# Patient Record
Sex: FEMALE | Race: WHITE | NOT HISPANIC OR LATINO | URBAN - METROPOLITAN AREA
[De-identification: names, ages, dates, MRNs, and addresses within clinical notes are randomized per-mention and may not be internally consistent; named-entity substitution may affect disease eponyms.]

---

## 2020-06-18 ENCOUNTER — OFFICE VISIT (OUTPATIENT)
Dept: URBAN - METROPOLITAN AREA CLINIC 113 | Facility: CLINIC | Age: 65
End: 2020-06-18

## 2020-07-25 ENCOUNTER — TELEPHONE ENCOUNTER (OUTPATIENT)
Dept: URBAN - METROPOLITAN AREA CLINIC 13 | Facility: CLINIC | Age: 65
End: 2020-07-25

## 2020-07-25 RX ORDER — DICYCLOMINE HYDROCHLORIDE 10 MG/1
TAKE 1 CAPSULE EVERY 6 HOURS PRN ABDOMINAL SPASM MDD:4 CAPSULE ORAL
Qty: 60 | Refills: 3 | OUTPATIENT
Start: 2017-10-20 | End: 2018-07-30

## 2020-07-25 RX ORDER — PANTOPRAZOLE SODIUM 40 MG
TAKE 1 TABLET DAILY PRN TABLET, DELAYED RELEASE (ENTERIC COATED) ORAL
Refills: 0 | OUTPATIENT
Start: 2008-05-19 | End: 2017-10-18

## 2020-07-25 RX ORDER — ESTRADIOL 0.1 MG/G
INSERT 1 APPLICATOR WEEKLY CREAM VAGINAL
Refills: 0 | OUTPATIENT
Start: 2013-05-25 | End: 2017-10-18

## 2020-07-25 RX ORDER — ATORVASTATIN CALCIUM 20 MG/1
TAKE 1 TABLET DAILY TABLET, FILM COATED ORAL
Refills: 0 | OUTPATIENT
Start: 2012-10-11 | End: 2017-10-18

## 2020-07-25 RX ORDER — HYOSCYAMINE SULFATE 0.12 MG/1
PLACE 1 TABLET UNDER THE TONGUE EVERY 4 TO 6 HOURS AS NEEDED TABLET, ORALLY DISINTEGRATING ORAL
Qty: 90 | Refills: 2 | OUTPATIENT
Start: 2017-10-19 | End: 2018-07-30

## 2020-07-25 RX ORDER — CLOPIDOGREL 75 MG/1
TAKE 1 TABLET DAILY TABLET ORAL
Qty: 30 | Refills: 0 | OUTPATIENT
Start: 2013-01-09 | End: 2020-06-18

## 2020-07-25 RX ORDER — SPIRONOLACTONE 50 MG/1
TAKE 1 TABLET DAILY TABLET, FILM COATED ORAL
Refills: 0 | OUTPATIENT
End: 2018-07-30

## 2020-07-26 ENCOUNTER — TELEPHONE ENCOUNTER (OUTPATIENT)
Dept: URBAN - METROPOLITAN AREA CLINIC 13 | Facility: CLINIC | Age: 65
End: 2020-07-26

## 2020-07-26 RX ORDER — KETOCONAZOLE 20 MG/G
CREAM TOPICAL
Qty: 60 | Refills: 0 | Status: ACTIVE | COMMUNITY
Start: 2012-12-06

## 2020-07-26 RX ORDER — BUPROPION HYDROCHLORIDE 150 MG/1
TAKE 1 TABLET IN THE MORNING AND AT NOON TABLET, EXTENDED RELEASE ORAL
Refills: 0 | Status: ACTIVE | COMMUNITY
Start: 2019-12-05

## 2020-07-26 RX ORDER — VALACYCLOVIR 1 G/1
TAKE 1 TABLET BY MOUTH THREE TIMES DAILY TABLET, FILM COATED ORAL
Qty: 21 | Refills: 0 | Status: ACTIVE | COMMUNITY
Start: 2019-10-14

## 2020-07-26 RX ORDER — CIPROFLOXACIN HYDROCHLORIDE 500 MG/1
TAKE 1 TABLET BY MOUTH TWICE DAILY TABLET, FILM COATED ORAL
Qty: 14 | Refills: 0 | Status: ACTIVE | COMMUNITY
Start: 2019-10-14

## 2020-07-26 RX ORDER — CEPHALEXIN 500 MG/1
TK 1 C PO BID FOR 10 DAYS CAPSULE ORAL
Qty: 20 | Refills: 0 | Status: ACTIVE | COMMUNITY
Start: 2019-07-06

## 2020-07-26 RX ORDER — LIFITEGRAST 50 MG/ML
INSTILL 1 DROP INTO EACH EYE TWICE DAILY SOLUTION/ DROPS OPHTHALMIC
Qty: 60 | Refills: 0 | Status: ACTIVE | COMMUNITY
Start: 2019-06-05

## 2020-07-26 RX ORDER — CLOPIDOGREL 75 MG/1
TABLET ORAL
Qty: 30 | Refills: 0 | Status: ACTIVE | COMMUNITY
Start: 2012-10-11

## 2020-07-26 RX ORDER — PRASTERONE 6.5 MG/1
INSERT ONE EVERY NIGHT INSERT VAGINAL
Qty: 28 | Refills: 0 | Status: ACTIVE | COMMUNITY
Start: 2020-01-23

## 2020-07-26 RX ORDER — ACYCLOVIR 50 MG/G
OINTMENT TOPICAL
Qty: 30 | Refills: 0 | Status: ACTIVE | COMMUNITY
Start: 2013-11-22

## 2020-07-26 RX ORDER — NITROFURANTOIN MONOHYDRATE/MACROCRYSTALLINE 25; 75 MG/1; MG/1
TAKE 1 CAPSULE BY MOUTH EVERY 12 HOURS WITH FOOD CAPSULE ORAL
Qty: 60 | Refills: 0 | Status: ACTIVE | COMMUNITY
Start: 2019-08-21

## 2020-07-26 RX ORDER — LOSARTAN POTASSIUM 50 MG/1
TAKE 1 TABLET DAILY TABLET, FILM COATED ORAL
Refills: 0 | Status: ACTIVE | COMMUNITY

## 2020-07-26 RX ORDER — ATORVASTATIN CALCIUM 40 MG/1
TABLET, FILM COATED ORAL
Qty: 30 | Refills: 0 | Status: ACTIVE | COMMUNITY
Start: 2013-03-11

## 2020-07-26 RX ORDER — ATORVASTATIN CALCIUM 40 MG/1
TABLET, FILM COATED ORAL
Qty: 30 | Refills: 0 | Status: ACTIVE | COMMUNITY
Start: 2013-01-09

## 2020-07-26 RX ORDER — VARENICLINE TARTRATE 1 MG/1
TAKE 1 TABLET TWICE DAILY TABLET, FILM COATED ORAL
Refills: 0 | Status: ACTIVE | COMMUNITY

## 2020-07-26 RX ORDER — AMOXICILLIN 500 MG/1
TAKE 1 CAPSULE BY MOUTH TWICE DAILY CAPSULE ORAL
Qty: 20 | Refills: 0 | Status: ACTIVE | COMMUNITY
Start: 2019-10-08

## 2020-07-26 RX ORDER — METHOTREXATE 2.5 MG/1
TAKE 4 TABLETS WEEKLY TABLET ORAL
Qty: 24 | Refills: 0 | Status: ACTIVE | COMMUNITY
Start: 2012-10-23

## 2020-07-26 RX ORDER — HYOSCYAMINE SULFATE 0.12 MG/1
PLACE 1 TABLET EVERY 6 HOURS PRN ABDOMINAL PAIN PLACE UNDER TONGUE TABLET, ORALLY DISINTEGRATING ORAL
Qty: 60 | Refills: 1 | Status: ACTIVE | COMMUNITY
Start: 2020-06-18

## 2020-07-26 RX ORDER — AZITHROMYCIN DIHYDRATE 250 MG/1
TABLET, FILM COATED ORAL
Qty: 6 | Refills: 0 | Status: ACTIVE | COMMUNITY
Start: 2013-11-22

## 2020-07-26 RX ORDER — PRASUGREL HYDROCHLORIDE 10 MG/1
TABLET, COATED ORAL
Qty: 30 | Refills: 0 | Status: ACTIVE | COMMUNITY
Start: 2012-09-11

## 2020-07-26 RX ORDER — NYSTATIN AND TRIAMCINOLONE ACETONIDE 100000; 1 MG/G; MG/G
CREAM TOPICAL
Qty: 30 | Refills: 0 | Status: ACTIVE | COMMUNITY
Start: 2012-09-22

## 2020-07-26 RX ORDER — OXYBUTYNIN CHLORIDE 5 MG/1
TAKE 1 TABLET BY MOUTH EVERY 8 HOURS AS NEEDED FOR BLADDER TABLET ORAL
Qty: 30 | Refills: 0 | Status: ACTIVE | COMMUNITY
Start: 2019-08-21

## 2020-07-26 RX ORDER — TEMAZEPAM 30 MG/1
TAKE 1 CAPSULE AT BEDTIME AS NEEDED CAPSULE ORAL
Refills: 1 | Status: ACTIVE | COMMUNITY

## 2020-07-26 RX ORDER — BETAMETHASONE VALERATE 1 MG/G
APPLY TWICE DAILY WHILE IRRITATED CREAM TOPICAL
Qty: 30 | Refills: 0 | Status: ACTIVE | COMMUNITY
Start: 2020-01-23

## 2020-07-26 RX ORDER — VARENICLINE TARTRATE 1 MG/1
TABLET, FILM COATED ORAL
Qty: 180 | Refills: 0 | Status: ACTIVE | COMMUNITY
Start: 2013-05-17

## 2020-07-26 RX ORDER — ATORVASTATIN CALCIUM 10 MG/1
TABLET, FILM COATED ORAL
Qty: 90 | Refills: 0 | Status: ACTIVE | COMMUNITY
Start: 2013-12-03

## 2020-07-26 RX ORDER — FLUCONAZOLE 200 MG/1
TABLET ORAL
Qty: 3 | Refills: 0 | Status: ACTIVE | COMMUNITY
Start: 2012-09-22

## 2020-07-26 RX ORDER — FLUCONAZOLE 150 MG/1
TABLET ORAL
Qty: 1 | Refills: 0 | Status: ACTIVE | COMMUNITY
Start: 2013-09-20

## 2020-07-26 RX ORDER — AMOXICILLIN 500 MG/1
CAPSULE ORAL
Qty: 30 | Refills: 0 | Status: ACTIVE | COMMUNITY
Start: 2013-09-20

## 2020-09-22 ENCOUNTER — OFFICE VISIT (OUTPATIENT)
Dept: URBAN - METROPOLITAN AREA CLINIC 113 | Facility: CLINIC | Age: 65
End: 2020-09-22

## 2020-10-14 ENCOUNTER — WEB ENCOUNTER (OUTPATIENT)
Dept: URBAN - METROPOLITAN AREA CLINIC 113 | Facility: CLINIC | Age: 65
End: 2020-10-14

## 2020-10-14 ENCOUNTER — OFFICE VISIT (OUTPATIENT)
Dept: URBAN - METROPOLITAN AREA CLINIC 113 | Facility: CLINIC | Age: 65
End: 2020-10-14
Payer: MEDICARE

## 2020-10-14 VITALS
TEMPERATURE: 97.1 F | SYSTOLIC BLOOD PRESSURE: 139 MMHG | HEIGHT: 67 IN | DIASTOLIC BLOOD PRESSURE: 65 MMHG | WEIGHT: 126 LBS | BODY MASS INDEX: 19.78 KG/M2 | RESPIRATION RATE: 20 BRPM | HEART RATE: 52 BPM

## 2020-10-14 DIAGNOSIS — K59.04 CHRONIC IDIOPATHIC CONSTIPATION: ICD-10-CM

## 2020-10-14 DIAGNOSIS — R10.31 RLQ ABDOMINAL PAIN: ICD-10-CM

## 2020-10-14 PROBLEM — 82934008: Status: ACTIVE | Noted: 2020-10-14

## 2020-10-14 PROCEDURE — 99213 OFFICE O/P EST LOW 20 MIN: CPT | Performed by: INTERNAL MEDICINE

## 2020-10-14 RX ORDER — FOLIC ACID 1 MG/1
1 TABLET TABLET ORAL ONCE A DAY
Status: ACTIVE | COMMUNITY

## 2020-10-14 RX ORDER — HYOSCYAMINE SULFATE 0.12 MG/1
PLACE 1 TABLET EVERY 6 HOURS PRN ABDOMINAL PAIN PLACE UNDER TONGUE TABLET, ORALLY DISINTEGRATING ORAL
Qty: 60 | Refills: 1 | Status: ACTIVE | COMMUNITY
Start: 2020-06-18

## 2020-10-14 RX ORDER — METHOTREXATE 2.5 MG/1
TAKE 8 TABLETS TABLET ORAL ONCE A DAY
Refills: 0 | Status: ACTIVE | COMMUNITY
Start: 2012-10-23

## 2020-10-14 RX ORDER — PRASTERONE 6.5 MG/1
INSERT ONE EVERY NIGHT INSERT VAGINAL
Qty: 28 | Refills: 0 | Status: ACTIVE | COMMUNITY
Start: 2020-01-23

## 2020-10-14 RX ORDER — LOSARTAN POTASSIUM 50 MG/1
TAKE 1 TABLET DAILY TABLET, FILM COATED ORAL
Refills: 0 | Status: ACTIVE | COMMUNITY

## 2020-10-14 RX ORDER — BISOPROLOL FUMARATE AND HYDROCHLOROTHIAZIDE 10; 6.25 MG/1; MG/1
1 TABLET TABLET, FILM COATED ORAL ONCE A DAY
Status: ACTIVE | COMMUNITY

## 2020-10-14 RX ORDER — TEMAZEPAM 30 MG/1
TAKE 1 CAPSULE AT BEDTIME AS NEEDED CAPSULE ORAL
Refills: 1 | Status: ACTIVE | COMMUNITY

## 2020-10-14 NOTE — HPI-TODAY'S VISIT:
Ms. Patiño is a 66yo female with a history of rheumatoid arthritis on Methotrexate, CAD s/p cardiac stenting on Plavix per Dr. Stallworth, GERD, dyspepsia, adenomatous colon polyps and diverticulosis coli who presents for follow-up evaluation of abdominal pain. She was last seen in the office on October 18, 2017, which she reported a several week history of dull, cramping right lower quadrant pain, with associated "bladder spasms" and occasional right hip pain radiating into her lower leg. She described the pain as a 1 on a scale of 1-10, but at its worst was described as a 4.  She has previously had a total hysterectomy and also reports a history of vulvar intraepithelial neoplasia (PATRICIA), managed by Dr. Menjivar, GYN oncologist.  The patient had a CT scan of the abdomen and pelvis ordered after her last office visit, and was given hyoscyamine for pain. This was done on October 24, 2017 and should've moderate amount of stool in the lower colon but no other abnormalities. The patient had another CT scan of the abdomen and pelvis ordered by Dr. Persaud on May 19, 2020 showing cholelithiasis, chronic constipation, prior appendectomy, and a mild small bowel stasis pattern, as well as a prior hysterectomy. Because of the gallstones, the patient had a HIDA scan performed on June 8, 2020 which showed no evidence of acute cholecystitis and no other abnormalities. The patient describes intermittent right lower quadrant abdominal pain. It was most severe in April 2020. It is not associated with food ingestion. It is often better with defecation. The patient was prescribed Amitiza by Dr. Persaud and is now having a bowel movement daily. She still is having intermittent right lower quadrant abdominal pain. She denies any fever, chills, or sweats. She was actually referred back to this office because of the gallstones seen on her CT scan. Of note, the patient had a colonoscopy done on October 26, 2018. This showed left colon diverticulosis, a 3 mm ascending colon polyp, no other abnormalities. The polyp had focal adenomatous change without dysplasia.   ?? Start : Hyoscyamine Sulfate 0.125 MG Sublingual Tablet Sublingual; PLACE 1 TABLET EVERY 6 HOURS PRN abdominal pain PLACE UNDER TONGUE 1. Begin hyoscyamine 0.25 mg sublingually every 6 hours as needed for pain 2. Benefiber one to 2 tablespoons and MiraLAX 17 g daily to improve bowel function 3. Follow-up in 3 months 4. Repeat colonoscopy in 2023 The patient is doing much better overall.  She is using MiraLAX and fiber and is having bowel movements every 2 days or so.  She is also using stool softeners on a daily basis.  She uses hyoscyamine whenever she gets crampy right lower quadrant pain and this helps her.  She has no specific GI complaints today.

## 2024-03-21 ENCOUNTER — OV NP (OUTPATIENT)
Dept: URBAN - METROPOLITAN AREA CLINIC 113 | Facility: CLINIC | Age: 69
End: 2024-03-21
Payer: MEDICARE

## 2024-03-21 VITALS
TEMPERATURE: 97.5 F | DIASTOLIC BLOOD PRESSURE: 53 MMHG | HEART RATE: 60 BPM | BODY MASS INDEX: 19.62 KG/M2 | HEIGHT: 67 IN | SYSTOLIC BLOOD PRESSURE: 141 MMHG | WEIGHT: 125 LBS

## 2024-03-21 DIAGNOSIS — R10.31 RLQ ABDOMINAL PAIN: ICD-10-CM

## 2024-03-21 DIAGNOSIS — Z86.010 HISTORY OF ADENOMATOUS POLYP OF COLON: ICD-10-CM

## 2024-03-21 DIAGNOSIS — K59.04 CHRONIC IDIOPATHIC CONSTIPATION: ICD-10-CM

## 2024-03-21 PROBLEM — 429047008: Status: ACTIVE | Noted: 2024-03-21

## 2024-03-21 PROBLEM — 301754002: Status: ACTIVE | Noted: 2024-03-21

## 2024-03-21 PROCEDURE — 99203 OFFICE O/P NEW LOW 30 MIN: CPT | Performed by: NURSE PRACTITIONER

## 2024-03-21 RX ORDER — METHOTREXATE 2.5 MG/1
TAKE 8 TABLETS TABLET ORAL ONCE A DAY
Refills: 0 | Status: ACTIVE | COMMUNITY
Start: 2012-10-23

## 2024-03-21 RX ORDER — PRASTERONE 6.5 MG/1
INSERT ONE EVERY NIGHT INSERT VAGINAL
Qty: 28 | Refills: 0 | Status: ACTIVE | COMMUNITY
Start: 2020-01-23

## 2024-03-21 RX ORDER — TEMAZEPAM 30 MG/1
TAKE 1 CAPSULE AT BEDTIME AS NEEDED CAPSULE ORAL
Refills: 1 | Status: ACTIVE | COMMUNITY

## 2024-03-21 RX ORDER — LOSARTAN POTASSIUM 50 MG/1
TAKE 1 TABLET DAILY TABLET, FILM COATED ORAL
Refills: 0 | Status: ACTIVE | COMMUNITY

## 2024-03-21 RX ORDER — FOLIC ACID 1 MG/1
1 TABLET TABLET ORAL ONCE A DAY
Status: ACTIVE | COMMUNITY

## 2024-03-21 RX ORDER — HYOSCYAMINE SULFATE 0.12 MG/1
PLACE 1 TABLET EVERY 6 HOURS PRN ABDOMINAL PAIN PLACE UNDER TONGUE TABLET, ORALLY DISINTEGRATING ORAL
Qty: 60 | Refills: 1 | Status: ACTIVE | COMMUNITY
Start: 2020-06-18

## 2024-03-21 RX ORDER — HYOSCYAMINE SULFATE 0.12 MG/1
PLACE 1 TABLET EVERY 6 HOURS PRN ABDOMINAL PAIN PLACE UNDER TONGUE TABLET, ORALLY DISINTEGRATING ORAL
Qty: 30 | Refills: 3
Start: 2020-06-18 | End: 2024-07-19

## 2024-03-21 RX ORDER — ONDANSETRON 4 MG/1
1 TABLET TABLET, ORALLY DISINTEGRATING ORAL
Qty: 6 | Refills: 0 | OUTPATIENT
Start: 2024-03-21

## 2024-03-21 RX ORDER — SODIUM PICOSULFATE, MAGNESIUM OXIDE, AND ANHYDROUS CITRIC ACID 12; 3.5; 1 G/175ML; G/175ML; MG/175ML
175 ML THE FIRST DOSE THE EVENING BEFORE AND SECOND DOSE THE MORNING OF COLONOSCOPY LIQUID ORAL ONCE A DAY
Qty: 350 | OUTPATIENT
Start: 2024-03-21 | End: 2024-03-23

## 2024-03-21 RX ORDER — HYDROXYCHLOROQUINE SULFATE 100 MG/1
AS DIRECTED TABLET ORAL
Status: ACTIVE | COMMUNITY

## 2024-03-21 RX ORDER — BISOPROLOL FUMARATE AND HYDROCHLOROTHIAZIDE 10; 6.25 MG/1; MG/1
1 TABLET TABLET, FILM COATED ORAL ONCE A DAY
Status: ACTIVE | COMMUNITY

## 2024-03-21 RX ORDER — DULOXETINE HYDROCHLORIDE 20 MG/1
1 CAPSULE CAPSULE, DELAYED RELEASE ORAL ONCE A DAY
Status: ACTIVE | COMMUNITY

## 2024-03-21 NOTE — HPI-TODAY'S VISIT:
She reports a stable cardiac status.  She had a catheterization a year ago that was normal.  Stent was placed in 2011.  She is on low-dose aspirin and is no longer taking Plavix. She has chronic, occasional pain in the right lower quadrant.  Prior evaluation was negative for source.  This seems to be associated with constipation suggesting spasm as an etiology.  Hyoscyamine has provided temporary relief in the past.  She has not needed this in months but is requesting a refill to have on hand.  She admits rare nausea.  She denies heartburn.  She eats activity a yogurt in the morning and takes a stool softener in the evening.  Her bowel movements are regular. She reports unremarkable labs in February.

## 2024-03-21 NOTE — HPI-TODAY'S VISIT:
This is a 68-year-old female with a history of rheumatoid arthritis on methotrexate, coronary artery disease status post stent on aspirin, GERD, dyspepsia, colon diverticulosis, and adenomatous colon polyps due surveillance in 2023 presenting to schedule a surveillance colonoscopy. She was last seen in the office 10/14/2020 for follow-up regarding right lower quadrant abdominal pain and chronic constipation.  She was doing well on a regimen of Benefiber 1 to 2 tablespoons daily and MiraLAX daily or every other day.  Hyoscyamine was effective in relieving abdominal pain.  She was to continue her current regimen.

## 2024-05-30 ENCOUNTER — OFFICE VISIT (OUTPATIENT)
Dept: URBAN - METROPOLITAN AREA SURGERY CENTER 25 | Facility: SURGERY CENTER | Age: 69
End: 2024-05-30

## 2024-05-30 RX ORDER — TEMAZEPAM 30 MG/1
TAKE 1 CAPSULE AT BEDTIME AS NEEDED CAPSULE ORAL
Refills: 1 | Status: ACTIVE | COMMUNITY

## 2024-05-30 RX ORDER — BISOPROLOL FUMARATE AND HYDROCHLOROTHIAZIDE 10; 6.25 MG/1; MG/1
1 TABLET TABLET, FILM COATED ORAL ONCE A DAY
Status: ACTIVE | COMMUNITY

## 2024-05-30 RX ORDER — ONDANSETRON 4 MG/1
1 TABLET TABLET, ORALLY DISINTEGRATING ORAL
Qty: 6 | Refills: 0 | Status: ACTIVE | COMMUNITY
Start: 2024-03-21

## 2024-05-30 RX ORDER — HYDROXYCHLOROQUINE SULFATE 100 MG/1
AS DIRECTED TABLET ORAL
Status: ACTIVE | COMMUNITY

## 2024-05-30 RX ORDER — DULOXETINE HYDROCHLORIDE 20 MG/1
1 CAPSULE CAPSULE, DELAYED RELEASE ORAL ONCE A DAY
Status: ACTIVE | COMMUNITY

## 2024-05-30 RX ORDER — PRASTERONE 6.5 MG/1
INSERT ONE EVERY NIGHT INSERT VAGINAL
Qty: 28 | Refills: 0 | Status: ACTIVE | COMMUNITY
Start: 2020-01-23

## 2024-05-30 RX ORDER — FOLIC ACID 1 MG/1
1 TABLET TABLET ORAL ONCE A DAY
Status: ACTIVE | COMMUNITY

## 2024-05-30 RX ORDER — HYOSCYAMINE SULFATE 0.12 MG/1
PLACE 1 TABLET EVERY 6 HOURS PRN ABDOMINAL PAIN PLACE UNDER TONGUE TABLET, ORALLY DISINTEGRATING ORAL
Qty: 30 | Refills: 3 | Status: ACTIVE | COMMUNITY
Start: 2020-06-18 | End: 2024-07-19

## 2024-05-30 RX ORDER — LOSARTAN POTASSIUM 50 MG/1
TAKE 1 TABLET DAILY TABLET, FILM COATED ORAL
Refills: 0 | Status: ACTIVE | COMMUNITY

## 2024-05-30 RX ORDER — METHOTREXATE 2.5 MG/1
TAKE 8 TABLETS TABLET ORAL ONCE A DAY
Refills: 0 | Status: ACTIVE | COMMUNITY
Start: 2012-10-23

## 2024-07-22 ENCOUNTER — OFFICE VISIT (OUTPATIENT)
Dept: URBAN - METROPOLITAN AREA CLINIC 113 | Facility: CLINIC | Age: 69
End: 2024-07-22